# Patient Record
Sex: MALE | URBAN - METROPOLITAN AREA
[De-identification: names, ages, dates, MRNs, and addresses within clinical notes are randomized per-mention and may not be internally consistent; named-entity substitution may affect disease eponyms.]

---

## 2022-10-24 ENCOUNTER — OFFICE VISIT (OUTPATIENT)
Facility: LOCATION | Age: 62
End: 2022-10-24
Payer: MEDICAID

## 2022-10-24 DIAGNOSIS — H04.123 DRY EYE SYNDROME OF BILATERAL LACRIMAL GLANDS: ICD-10-CM

## 2022-10-24 DIAGNOSIS — H25.13 AGE-RELATED NUCLEAR CATARACT, BILATERAL: Primary | ICD-10-CM

## 2022-10-24 DIAGNOSIS — H40.023 OPEN ANGLE WITH BORDERLINE FINDINGS, HIGH RISK, BILATERAL: ICD-10-CM

## 2022-10-24 PROCEDURE — 92133 CPTRZD OPH DX IMG PST SGM ON: CPT | Performed by: OPHTHALMOLOGY

## 2022-10-24 PROCEDURE — 92004 COMPRE OPH EXAM NEW PT 1/>: CPT | Performed by: OPHTHALMOLOGY

## 2022-10-24 ASSESSMENT — KERATOMETRY
OD: 42.13
OS: 42.75

## 2022-10-24 ASSESSMENT — INTRAOCULAR PRESSURE
OD: 13
OS: 19

## 2022-10-24 ASSESSMENT — VISUAL ACUITY
OD: 20/30
OS: 20/30

## 2022-10-24 NOTE — IMPRESSION/PLAN
Impression: Age-related nuclear cataract, bilateral: H25.13. Plan: Cataracts account for the patient's complaints. No treatment currently recommended. The patient will monitor vision changes and contact us with any decrease in vision.  6 month cat deandreal

## 2022-10-24 NOTE — IMPRESSION/PLAN
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 10/22 OCT82/94 3/7 Plan: Discussed risk of vision loss with glaucoma and need for close f/u. IOP reasonable for Luis Alfredo Emilianoadalbertociara 73 appearance OU. Will follow off drops for now. OCT ordered and discussed with patient. 6 month HVF/Pachs

## 2023-04-25 ENCOUNTER — OFFICE VISIT (OUTPATIENT)
Facility: LOCATION | Age: 63
End: 2023-04-25
Payer: MEDICAID

## 2023-04-25 DIAGNOSIS — H40.023 OPEN ANGLE WITH BORDERLINE FINDINGS, HIGH RISK, BILATERAL: Primary | ICD-10-CM

## 2023-04-25 DIAGNOSIS — H11.023 CENTRAL PTERYGIUM, BILATERAL: ICD-10-CM

## 2023-04-25 PROCEDURE — 76514 ECHO EXAM OF EYE THICKNESS: CPT | Performed by: OPHTHALMOLOGY

## 2023-04-25 PROCEDURE — 99214 OFFICE O/P EST MOD 30 MIN: CPT | Performed by: OPHTHALMOLOGY

## 2023-04-25 PROCEDURE — 92083 EXTENDED VISUAL FIELD XM: CPT | Performed by: OPHTHALMOLOGY

## 2023-04-25 ASSESSMENT — INTRAOCULAR PRESSURE
OD: 15
OS: 16

## 2023-04-25 NOTE — IMPRESSION/PLAN
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 10/22 OCT82/94 3/7 Plan: Discussed risk of vision loss with glaucoma and need for close f/u. IOP reasonable for Luis Alfredo Jaime 73 appearance OU. Will follow off drops for now. HVF/PACHS ordered and discussed with patient. Return in 6 months for IOP Check/DE OCT RNFL with Dr Budd Boas.